# Patient Record
Sex: FEMALE | Race: WHITE | URBAN - METROPOLITAN AREA
[De-identification: names, ages, dates, MRNs, and addresses within clinical notes are randomized per-mention and may not be internally consistent; named-entity substitution may affect disease eponyms.]

---

## 2017-01-03 ENCOUNTER — DOCUMENTATION ONLY (OUTPATIENT)
Dept: FAMILY MEDICINE | Facility: CLINIC | Age: 32
End: 2017-01-03

## 2017-01-03 NOTE — PROGRESS NOTES
Hello [facility] [place]  Please review chart notes to evaluate for in house therapy.  I forgot to route to Team.  Thank you.  Rose Dallas  1/3/17    Letter mailed to patient with a list of Psychology clinics per Dr. Justice's recommendations.  Rose Dallas  1/4/17

## 2017-01-04 NOTE — PROGRESS NOTES
Looks like this is appropriate for referral to one of our community partners for on-going psychiatry care.  Please share resources listed below:    [facility]  [address]  [address]  [place] [address]  [phone]    [facility]  [facility]  [address]  [place], [place] [address]  [phone]    [facility] - [address]   [facility] [address]  [phone]

## 2017-01-09 ENCOUNTER — TELEPHONE (OUTPATIENT)
Dept: FAMILY MEDICINE | Facility: CLINIC | Age: 32
End: 2017-01-09

## 2017-01-09 NOTE — TELEPHONE ENCOUNTER
Step mom calling to request more ativan for pt. Pt will not be seeing psych until Jan 23rd. She is all out of ativan. Step mom states the doctor told them that he can fill ativan until next psych appt. Advised stepmom that refills of controlled substances requires a visit in clinic but will check w/ last MD that she saw (Dr. Calhoun) if there is an exception to this. Offered to schedule a visit in clinic to step mom as well since Dr. Calhoun is not in clinic today Step mom states pt is currently sleeping. No info about pt was given to step mom.    Routed to Dr. Calhoun. /EVE Mahmood

## 2017-01-09 NOTE — TELEPHONE ENCOUNTER
[facility] phone call message- general phone call:    Reason for call: Would like to talk with her PCP or nurse. Needs ativan before she can get into psy dept. Needs something for the time being.     Return call needed: Yes    OK to leave a message on voice mail? Yes    Primary language: English      needed? No    Call taken on January 9, 2017 at 10:04 AM by Sukhdeep Salmaanca

## 2017-01-11 NOTE — TELEPHONE ENCOUNTER
Patient will need to schedule a clinic visit to see me before I can prescribe any Ativan.     Thanks,    Sudhir Calhoun MD  PGY1 [facility]

## 2017-11-26 ENCOUNTER — HEALTH MAINTENANCE LETTER (OUTPATIENT)
Age: 32
End: 2017-11-26

## 2021-05-26 ENCOUNTER — RECORDS - HEALTHEAST (OUTPATIENT)
Dept: ADMINISTRATIVE | Facility: CLINIC | Age: 36
End: 2021-05-26